# Patient Record
Sex: MALE | Race: WHITE
[De-identification: names, ages, dates, MRNs, and addresses within clinical notes are randomized per-mention and may not be internally consistent; named-entity substitution may affect disease eponyms.]

---

## 2017-06-13 ENCOUNTER — HOSPITAL ENCOUNTER (OUTPATIENT)
Dept: HOSPITAL 45 - C.LABBFT | Age: 72
Discharge: HOME | End: 2017-06-13
Attending: PHYSICIAN ASSISTANT
Payer: COMMERCIAL

## 2017-06-13 DIAGNOSIS — E78.5: Primary | ICD-10-CM

## 2017-06-13 DIAGNOSIS — E11.9: ICD-10-CM

## 2017-06-13 DIAGNOSIS — I10: ICD-10-CM

## 2017-06-13 LAB
ANION GAP SERPL CALC-SCNC: 11 MMOL/L (ref 3–11)
APPEARANCE UR: CLEAR
BASOPHILS # BLD: 0.01 K/UL (ref 0–0.2)
BASOPHILS NFR BLD: 0.1 %
BILIRUB UR-MCNC: (no result) MG/DL
BUN SERPL-MCNC: 27 MG/DL (ref 7–18)
BUN/CREAT SERPL: 20.8 (ref 10–20)
CALCIUM SERPL-MCNC: 8.7 MG/DL (ref 8.5–10.1)
CHLORIDE SERPL-SCNC: 108 MMOL/L (ref 98–107)
CHOLEST/HDLC SERPL: 2.4 {RATIO}
CO2 SERPL-SCNC: 25 MMOL/L (ref 21–32)
COLOR UR: YELLOW
COMPLETE: YES
CREAT SERPL-MCNC: 1.3 MG/DL (ref 0.6–1.4)
CREAT UR-MCNC: 100 MG/DL
EOSINOPHIL NFR BLD AUTO: 266 K/UL (ref 130–400)
GLUCOSE SERPL-MCNC: 135 MG/DL (ref 70–99)
GLUCOSE UR QL: 50 MG/DL
HCT VFR BLD CALC: 48.8 % (ref 42–52)
IG%: 0.2 %
IMM GRANULOCYTES NFR BLD AUTO: 27.8 %
KETONES UR QL STRIP: 52 MG/DL
LYMPHOCYTES # BLD: 2.35 K/UL (ref 1.2–3.4)
MANUAL MICROSCOPIC REQUIRED?: NO
MCH RBC QN AUTO: 30.9 PG (ref 25–34)
MCHC RBC AUTO-ENTMCNC: 31.8 G/DL (ref 32–36)
MCV RBC AUTO: 97.4 FL (ref 80–100)
MONOCYTES NFR BLD: 9.1 %
NEUTROPHILS # BLD AUTO: 3.6 %
NEUTROPHILS NFR BLD AUTO: 59.2 %
NITRITE UR QL STRIP: (no result)
NITRITE UR QL STRIP: 84 MG/DL (ref 0–150)
PH UR STRIP: 6.5 [PH] (ref 4.5–7.5)
PH UR: 119 MG/DL (ref 0–200)
PMV BLD AUTO: 10.3 FL (ref 7.4–10.4)
POTASSIUM SERPL-SCNC: 4.3 MMOL/L (ref 3.5–5.1)
RATIO: 8 MCG/MG (ref 0–30)
RBC # BLD AUTO: 5.01 M/UL (ref 4.7–6.1)
REVIEW REQ?: NO
SODIUM SERPL-SCNC: 144 MMOL/L (ref 136–145)
SP GR UR STRIP: 1.02 (ref 1–1.03)
URINE EPITHELIAL CELL AUTO: (no result) /LPF (ref 0–5)
UROBILINOGEN UR-MCNC: (no result) MG/DL
VERY LOW DENSITY LIPOPROT CALC: 17 MG/DL
WBC # BLD AUTO: 8.44 K/UL (ref 4.8–10.8)
ZZUR CULT IF INDIC CLEAN CATCH: NO

## 2017-11-27 ENCOUNTER — HOSPITAL ENCOUNTER (OUTPATIENT)
Dept: HOSPITAL 45 - C.LABBFT | Age: 72
Discharge: HOME | End: 2017-11-27
Attending: UROLOGY
Payer: COMMERCIAL

## 2017-11-27 DIAGNOSIS — N40.0: Primary | ICD-10-CM

## 2017-12-28 ENCOUNTER — HOSPITAL ENCOUNTER (OUTPATIENT)
Dept: HOSPITAL 45 - C.LABBFT | Age: 72
Discharge: HOME | End: 2017-12-28
Attending: INTERNAL MEDICINE
Payer: COMMERCIAL

## 2017-12-28 DIAGNOSIS — E11.9: Primary | ICD-10-CM

## 2017-12-28 DIAGNOSIS — I10: ICD-10-CM

## 2017-12-28 LAB
ANION GAP SERPL CALC-SCNC: 8 MMOL/L (ref 3–11)
BUN SERPL-MCNC: 37 MG/DL (ref 7–18)
BUN/CREAT SERPL: 28.2 (ref 10–20)
CALCIUM SERPL-MCNC: 9.1 MG/DL (ref 8.5–10.1)
CHLORIDE SERPL-SCNC: 105 MMOL/L (ref 98–107)
CO2 SERPL-SCNC: 26 MMOL/L (ref 21–32)
CREAT SERPL-MCNC: 1.31 MG/DL (ref 0.6–1.4)
EST. AVERAGE GLUCOSE BLD GHB EST-MCNC: 148 MG/DL
GLUCOSE SERPL-MCNC: 147 MG/DL (ref 70–99)
POTASSIUM SERPL-SCNC: 4.2 MMOL/L (ref 3.5–5.1)
SODIUM SERPL-SCNC: 138 MMOL/L (ref 136–145)

## 2018-01-08 ENCOUNTER — HOSPITAL ENCOUNTER (OUTPATIENT)
Dept: HOSPITAL 45 - C.RAD1850 | Age: 73
Discharge: HOME | End: 2018-01-08
Attending: INTERNAL MEDICINE
Payer: COMMERCIAL

## 2018-01-08 DIAGNOSIS — M19.011: ICD-10-CM

## 2018-01-08 DIAGNOSIS — R05: Primary | ICD-10-CM

## 2018-01-08 NOTE — DIAGNOSTIC IMAGING REPORT
R SHOULDER MIN 2 VIEWS ROUTINE



CLINICAL HISTORY: Decreased range of motion.    



COMPARISON: MRI of the right shoulder April 11, 2009.



FINDINGS:  Alignment of the right shoulder is anatomic. No fracture or

suspicious osseous lesion is present. There is moderate acromioclavicular joint

osteoarthritis and mild glenohumeral joint osteoarthritis. A 4 mm calcific

density along the superolateral aspect of the right right suggests calcific

tendinitis.



IMPRESSION: 



1. Moderate osteoarthritis of the right acromioclavicular joint and mild

osteoarthritis of the right glenohumeral joint.



2. Findings suggestive of mild calcific tendinitis of the right rotator cuff.







Electronically signed by:  Regino Real M.D.

1/8/2018 1:15 PM



Dictated Date/Time:  1/8/2018 1:13 PM

## 2018-02-27 ENCOUNTER — HOSPITAL ENCOUNTER (OUTPATIENT)
Dept: HOSPITAL 45 - C.RAD1850 | Age: 73
Discharge: HOME | End: 2018-02-27
Attending: PHYSICIAN ASSISTANT
Payer: COMMERCIAL

## 2018-02-27 DIAGNOSIS — J44.9: Primary | ICD-10-CM

## 2018-02-27 NOTE — DIAGNOSTIC IMAGING REPORT
CHEST 2 VIEWS ROUTINE



HISTORY:      J44.9 Chronic obstructive pulmonary [id number]



COMPARISON: None.



FINDINGS: The lungs are clear. Cardiac silhouette is normal in size. No pleural

effusions. No pneumothorax.



IMPRESSION:

No acute process.







Electronically signed by:  Tim Abarca M.D.

2/27/2018 3:59 PM



Dictated Date/Time:  2/27/2018 3:56 PM